# Patient Record
Sex: FEMALE | Race: OTHER
[De-identification: names, ages, dates, MRNs, and addresses within clinical notes are randomized per-mention and may not be internally consistent; named-entity substitution may affect disease eponyms.]

---

## 2017-07-11 ENCOUNTER — HOSPITAL ENCOUNTER (EMERGENCY)
Dept: HOSPITAL 80 - FED | Age: 27
Discharge: HOME | End: 2017-07-11
Payer: MEDICAID

## 2017-07-11 VITALS
SYSTOLIC BLOOD PRESSURE: 98 MMHG | TEMPERATURE: 98.2 F | RESPIRATION RATE: 15 BRPM | HEART RATE: 88 BPM | DIASTOLIC BLOOD PRESSURE: 62 MMHG | OXYGEN SATURATION: 97 %

## 2017-07-11 DIAGNOSIS — H91.91: ICD-10-CM

## 2017-07-11 DIAGNOSIS — Z85.41: ICD-10-CM

## 2017-07-11 DIAGNOSIS — R47.01: Primary | ICD-10-CM

## 2017-07-11 DIAGNOSIS — Z85.118: ICD-10-CM

## 2017-07-11 LAB
% IMMATURE GRANULYOCYTES: 0.6 % (ref 0–1.1)
ABSOLUTE IMMATURE GRANULOCYTES: 0.04 10^3/UL (ref 0–0.1)
ABSOLUTE NRBC COUNT: 0 10^3/UL (ref 0–0.01)
ADD DIFF?: NO
ADD MORPH?: NO
ADD SCAN?: NO
ANION GAP SERPL CALC-SCNC: 14 MEQ/L (ref 8–16)
ATYPICAL LYMPHOCYTE FLAG: 20 (ref 0–99)
CALCIUM SERPL-MCNC: 9.6 MG/DL (ref 8.5–10.4)
CHLORIDE SERPL-SCNC: 108 MEQ/L (ref 97–110)
CO2 SERPL-SCNC: 20 MEQ/L (ref 22–31)
CREAT SERPL-MCNC: 0.6 MG/DL (ref 0.6–1)
ERYTHROCYTE [DISTWIDTH] IN BLOOD BY AUTOMATED COUNT: 12.5 % (ref 11.5–15.2)
FRAGMENT RBC FLAG: 0 (ref 0–99)
GFR SERPL CREATININE-BSD FRML MDRD: > 60 ML/MIN/{1.73_M2}
GLUCOSE SERPL-MCNC: 89 MG/DL (ref 70–100)
HCT VFR BLD CALC: 45.1 % (ref 38–47)
HGB BLD-MCNC: 15.4 G/DL (ref 12.6–16.3)
LEFT SHIFT FLG: 0 (ref 0–99)
LIPEMIA HEMOLYSIS FLAG: 90 (ref 0–99)
MCH RBC BLDCO QN: 32 PG (ref 27.9–34.1)
MCHC RBC AUTO-ENTMCNC: 34.1 G/DL (ref 32.4–36.7)
MCV RBC AUTO: 93.8 FL (ref 81.5–99.8)
NRBC-AUTO%: 0 % (ref 0–0.2)
PLATELET # BLD: 257 10^3/UL (ref 150–400)
PLATELET CLUMPS FLAG: 0 (ref 0–99)
PMV BLD AUTO: 10.2 FL (ref 8.7–11.7)
POTASSIUM SERPL-SCNC: 4.5 MEQ/L (ref 3.5–5.2)
RBC # BLD AUTO: 4.81 10^6/UL (ref 4.18–5.33)
SODIUM SERPL-SCNC: 142 MEQ/L (ref 134–144)

## 2017-07-11 PROCEDURE — A9585 GADOBUTROL INJECTION: HCPCS

## 2017-07-11 NOTE — EDPHY
H & P


Time Seen by Provider: 07/11/17 11:42


HPI/ROS: 





CHIEF COMPLAINT:  Aphasia, hearing loss right ear





HISTORY OF PRESENT ILLNESS:  26-year-old female presents to the emergency 

department with expressive aphasia and hearing loss in her right ear 

intermittently for the last 2 months.  Patient has a history of stage III 

uterine cancer and was treated with chemotherapy finishing in August of 2015.  

She was told that 1 of her chemotherapy drugs could possibly be related to 

strokes.  She states 2 months ago she began having hearing loss in her right 

ear followed by expressive aphasia.  She states the episodes last approximately 

10 minutes and then will resolve.  She typically gets 2 episodes per week for 

the last 2 months.  This is not accompanied by headache or dizziness.  No chest 

pain or difficulty breathing.  No dysphagia.  No pain in her right ear 

currently although earlier today she did have associated pain with the hearing 

loss.  No dizziness or neck pain.





REVIEW OF SYSTEMS:


Constitutional:  No fever, no chills.


Eyes:  No double or blurry vision.


ENT:  No sore throat.


Respiratory:  No cough, no shortness of breath.


Cardiac:  No chest pain.


Gastrointestinal:  No abdominal pain, vomiting or diarrhea.


Genitourinary:  No dysuria.


Musculoskeletal:  No neck or back pain.


Skin:  No rashes.


Neurological:  No headache.


Past Medical/Surgical History: 





Stage III uterine cancer with metastasis to the lungs treated with chemotherapy 

last treated in August of 2015, status post hysterectomy with partial 

oophorectomy


Social History: 





Divorce and lives in [place]


Smoking Status: Never smoked


Physical Exam: 





General Appearance:  Alert, no distress. Vital signs are stable.  Mentating 

normally and answering questions appropriately.


Eyes:  Pupils equal and round.  Extraocular motions are all intact.


ENT:  Mouth:  Mucous membranes moist.


Respiratory:  No wheezing, rhonchi, or rales, lungs are clear to auscultation.


Cardiovascular:  Regular rate and rhythm.


Gastrointestinal:  Abdomen is soft and nontender, no masses, no rebound or 

guarding, bowel sounds normal.


Neurological:  Alert and oriented x 3, cranial nerves II through XII grossly 

intact


Skin:  Warm and dry, no rashes.


Musculoskeletal:  Nontender to palpate along the cervical, thoracic or lumbar 

spine.  Neck is supple.


Extremities:  Full range of motion and no peripheral edema.


Psychiatric:  Patient is oriented X 3, there is no agitation.


Constitutional: 


 Initial Vital Signs











Temperature (C)  36.7 C   07/11/17 11:21


 


Heart Rate  61   07/11/17 11:21


 


Respiratory Rate  18   07/11/17 11:21


 


Blood Pressure  119/80   07/11/17 11:21


 


O2 Sat (%)  100   07/11/17 11:21








 











O2 Delivery Mode               Room Air














Allergies/Adverse Reactions: 


 





No Known Allergies Allergy (Verified 07/11/17 11:21)


 








Home Medications: 














 Medication  Instructions  Recorded


 


NK [No Known Home Meds]  03/07/16














Medical Decision Making





- Diagnostics


Imaging Results: 


 Imaging Impressions





Brain MRI  07/11/17 12:15


Impression:  Normal MRI of the brain without and with contrast.


 


 


 


Findings and recommendations discussed with Emergency Department physician, 

CHERRI SCHWARZ  at  14:19 hour, 7/11/2017.


 


Final report concurs with initial preliminary interpretation.








Neck MRA  07/11/17 12:15


Impression: MRA of the cervical carotids and vertebrals demonstrate no evidence 

of flow-limiting stenosis, occlusion, or dissection.


 


Measurements of carotid stenosis is based on the residual internal carotid 

diameter with [employer] Symptomatic Carotid Endarterectomy Trial (NASCET) 

based stenosis levels.


 


Findings and recommendations discussed with Emergency Department physician, 

Cherri Schwarz, at 1423 hours on July 11, 2017.  


 


Final report concurs with initial preliminary interpretation.


 











Imaging: Discussed imaging studies w/ On call Radiologist


ED Course/Re-evaluation: 





26-year-old female presents to the emergency department with intermittent 

hearing loss and expressive aphasia for last 2 months.  I spoke with Dr. Patrick Rice, on-call neurologist, recommended MRI without and with contrast of the 

brain as well as MRA without contrast of the neck.





Her imaging studies were all within normal limits.





The patient is comfortable being discharged home.  The case was also discussed 

with Dr. Marya Mckinnon, supervising physician, who did not directly evaluate the 

patient but agrees with imaging studies, treatment and plan.


Differential Diagnosis: 





Including but not limited to TIA, metastasis, CVA, intracranial bleed





- Data Points


Laboratory Results: 


 Laboratory Results





 07/11/17 12:12 





 07/11/17 12:12 





 











  07/11/17 07/11/17 07/11/17





  12:12 12:12 12:08


 


WBC    6.21 10^3/uL 10^3/uL  





    (3.80-9.50)  


 


RBC    4.81 10^6/uL 10^6/uL  





    (4.18-5.33)  


 


Hgb    15.4 g/dL g/dL  





    (12.6-16.3)  


 


POC Hgb      16.7 gm/dL H gm/dL





     (12.6-16.3) 


 


Hct    45.1 % %  





    (38.0-47.0)  


 


POC Hct      49 % H %





     (38-47) 


 


MCV    93.8 fL fL  





    (81.5-99.8)  


 


MCH    32.0 pg pg  





    (27.9-34.1)  


 


MCHC    34.1 g/dL g/dL  





    (32.4-36.7)  


 


RDW    12.5 % %  





    (11.5-15.2)  


 


Plt Count    257 10^3/uL 10^3/uL  





    (150-400)  


 


MPV    10.2 fL fL  





    (8.7-11.7)  


 


Neut % (Auto)    60.2 % %  





    (39.3-74.2)  


 


Lymph % (Auto)    29.8 % %  





    (15.0-45.0)  


 


Mono % (Auto)    6.4 % %  





    (4.5-13.0)  


 


Eos % (Auto)    1.9 % %  





    (0.6-7.6)  


 


Baso % (Auto)    1.1 % %  





    (0.3-1.7)  


 


Nucleat RBC Rel Count    0.0 % %  





    (0.0-0.2)  


 


Absolute Neuts (auto)    3.73 10^3/uL 10^3/uL  





    (1.70-6.50)  


 


Absolute Lymphs (auto)    1.85 10^3/uL 10^3/uL  





    (1.00-3.00)  


 


Absolute Monos (auto)    0.40 10^3/uL 10^3/uL  





    (0.30-0.80)  


 


Absolute Eos (auto)    0.12 10^3/uL 10^3/uL  





    (0.03-0.40)  


 


Absolute Basos (auto)    0.07 10^3/uL 10^3/uL  





    (0.02-0.10)  


 


Absolute Nucleated RBC    0.00 10^3/uL 10^3/uL  





    (0-0.01)  


 


Immature Gran %    0.6 % %  





    (0.0-1.1)  


 


Immature Gran #    0.04 10^3/uL 10^3/uL  





    (0.00-0.10)  


 


POC Sodium      143 mEq/L mEq/L





     (134-144) 


 


Sodium  142 mEq/L mEq/L    





   (134-144)   


 


POC Potassium      4.0 mEq/L mEq/L





     (3.3-5.0) 


 


Potassium  4.5 mEq/L mEq/L    





   (3.5-5.2)   


 


POC Chloride      103 mEq/L mEq/L





     () 


 


Chloride  108 mEq/L mEq/L    





   ()   


 


Carbon Dioxide  20 mEq/l L mEq/l    





   (22-31)   


 


Anion Gap  14 mEq/L mEq/L    





   (8-16)   


 


POC BUN      15 mg/dL mg/dL





     (7-23) 


 


BUN  15 mg/dL mg/dL    





   (7-23)   


 


Creatinine  0.6 mg/dL mg/dL    





   (0.6-1.0)   


 


POC Creatinine      0.6 mg/dL mg/dL





     (0.6-1.0) 


 


Estimated GFR  > 60     





    


 


Glucose  89 mg/dL mg/dL    





   ()   


 


POC Glucose      93 mg/dL mg/dL





     () 


 


Calcium  9.6 mg/dL mg/dL    





   (8.5-10.4)   











Point of Care Test Results: 


 











  07/11/17





  12:08


 


POC Sodium  143


 


POC Potassium  4.0


 


POC Chloride  103


 


POC BUN  15


 


POC Creatinine  0.6


 


POC Glucose  93














Departure





- Departure


Disposition: Home, Routine, Self-Care


Clinical Impression: 


 Expressive aphasia





Hearing loss in right ear


Qualifiers:


 Hearing loss type: unspecified Qualified Code(s): H91.91 - Unspecified hearing 

loss, right ear





Condition: Good


Instructions:  Hearing Loss (ED)


Additional Instructions: 


Follow-up with neurologist this week.  When you call to arrange for a follow-up 

appointment, tell them that you were seen in the emergency department and we 

spoke with Dr. Patrick Rice while you were here and they want to see you for 

follow-up.


Referrals: 


Patrick Rice MD [Medical Doctor] - 2-3 days without fail (Neurologist on-call)

## 2017-07-24 ENCOUNTER — HOSPITAL ENCOUNTER (OUTPATIENT)
Dept: HOSPITAL 80 - FCPNEURO | Age: 27
End: 2017-07-24
Attending: PSYCHIATRY & NEUROLOGY
Payer: MEDICAID

## 2017-07-24 DIAGNOSIS — R47.01: Primary | ICD-10-CM

## 2017-07-25 NOTE — CPEEG
[f 
rep st]



                                                      ELECTROENCEPHALOGRAM





A 4-HOUR VIDEO EEG.



DATE OF STUDY:  07/25/2017



DATE OF INTERPRETATION:  07/25/2017





INTERPRETATION:  This 4-hour video EEG recording is normal.  There were no 
potentially epileptogenic abnormalities present during the recording.  During 
the video EEG monitoring session, the patient did not have any clinical events.



REPORT:  This 4-hour video EEG contains 10 Hz alpha activity to the posterior 
head regions.  There was no abnormal activation at rest, during photic 
stimulation, or hyperventilation.  The patient became drowsy and fell into 
sustained sleep during this study.  There was no abnormal activation during 
drowsiness, sustained sleep, or during times of arousal.  The patient did not 
have any clinical events during the video EEG monitoring session.





Job #:  [id number]

MTDD

## 2018-03-06 ENCOUNTER — HOSPITAL ENCOUNTER (OUTPATIENT)
Dept: HOSPITAL 80 - FIMAGING | Age: 28
End: 2018-03-06
Attending: PHYSICIAN ASSISTANT
Payer: MEDICAID

## 2018-03-06 DIAGNOSIS — Z85.42: ICD-10-CM

## 2018-03-06 DIAGNOSIS — N64.4: Primary | ICD-10-CM

## 2018-04-18 ENCOUNTER — HOSPITAL ENCOUNTER (EMERGENCY)
Dept: HOSPITAL 80 - FED | Age: 28
Discharge: HOME | End: 2018-04-18
Payer: MEDICAID

## 2018-04-18 VITALS — DIASTOLIC BLOOD PRESSURE: 74 MMHG | SYSTOLIC BLOOD PRESSURE: 108 MMHG

## 2018-04-18 DIAGNOSIS — J02.9: Primary | ICD-10-CM

## 2018-04-18 NOTE — EDPHY
H & P


Time Seen by Provider: 04/18/18 13:25


HPI/ROS: 





Chief complaint.  Sore throat





HPI.  27-year-old female with 3 day history sore throat.  She felt she saw some 

white spots on the posterior left part of her throat.  She has had cough and 

some headache.  No fever but occasional chills.  Her daughter has been sick 

with ear infection.  No abdominal pain.  No shortness of breath.





ROS


Constitutional.  Chills


Eyes.  no problems with vision


ENT.  Sore throat


Cardiovascular.  no chest pain


Respiratory.  Cough


Abdominal.  no abdominal pain, no nausea/vomiting, no diarrhea


.  no problems urinating


MS.  no calf pain/swelling, no neck/back pain, no joint pain


Skin.  no rash


Lymph.  no swollen glands


Neuro.  no headache, no dizziness, no difficulty walking or with speech


Past Medical/Surgical History: 





Oophorectomy, molar pregnancy, choriocarcinoma, hysterectomy


Social History: 





, nonsmoker, no alcohol


Smoking Status: Never smoked


Physical Exam: 





General Appearance:  Alert pleasant well-developed female mild distress vital 

signs are stable


Eyes: Pupils equal and round no pallor or injection.


ENT, tympanic membranes are normal.  Pharynx injected without exudate.  Mucous 

membranes are moist


Respiratory:  There are no retractions, lungs are clear to auscultation.


Cardiovascular: Regular rate and rhythm.


Gastrointestinal:   Abdomen is soft and nontender, no masses, bowel sounds 

normal.


Neurological:  Awake and alert, sensory and motor exams grossly normal.


Skin: Warm and dry, no rashes.


Musculoskeletal:  Neck is supple nontender.


Extremities  symmetrical, full range of motion.


Psychiatric: Patient is oriented X 3, there is no agitation.


Constitutional: 


 Initial Vital Signs











Temperature (C)  36.7 C   04/18/18 12:54


 


Heart Rate  73   04/18/18 12:54


 


Respiratory Rate  16   04/18/18 12:54


 


Blood Pressure  106/70   04/18/18 12:54


 


O2 Sat (%)  96   04/18/18 12:54








 











O2 Delivery Mode               Room Air














Allergies/Adverse Reactions: 


 





No Known Allergies Allergy (Verified 07/11/17 11:21)


 








Home Medications: 














 Medication  Instructions  Recorded


 


NK [No Known Home Meds]  03/07/16














Medical Decision Making


Procedures: 





Rapid strep screen





Decadron orally in the ED


ED Course/Re-evaluation: 





Re-evaluation 2:25 pm--the patient is stable.  She and I discussed laboratory 

evaluation, treatment plan, criteria for return, importance of follow-up and 

further evaluation.  She expresses understanding


Differential Diagnosis: 





I considered strep pharyngitis, viral syndrome.





- Data Points


Laboratory Results: 


 











  04/18/18 04/18/18





  Unknown 13:45


 


Group A Strep Screen    NEGATIVE 





    (NEGATIVE) 


 


Group A Strep DNA  Pending   





   














Departure





- Departure


Disposition: Home, Routine, Self-Care


Clinical Impression: 


Pharyngitis


Qualifiers:


 Pharyngitis/tonsillitis etiology: unspecified etiology Qualified Code(s): 

J02.9 - Acute pharyngitis, unspecified





Condition: Good


Instructions:  Pharyngitis (ED)


Additional Instructions: 


Drink plenty of fluids and stay hydrated.  Tylenol 650 mg every 4-6 hours, 

ibuprofen 600 mg every 6 hr as needed for discomfort.  Return for worsening 

symptoms.  Recheck in 2-3 days if not improved


Referrals: 


Ksenia Mayo, PAC [Primary Care Provider] - 2-3 days, if not improved